# Patient Record
(demographics unavailable — no encounter records)

---

## 2025-05-13 NOTE — HISTORY OF PRESENT ILLNESS
[1] : 1 [FreeTextEntry1] : 52-year-old male presents to the office for BPH. Pt has a h/o left nephrolithiasis s/p left ESWL 01/2017. He presents for PSA results.  Patient reports doing well. He has no significant urinary symptoms to report. He denies any fever, nausea, and other constitutional symptoms.  He has a family history of prostate cancer in his father at 70 years old, father s/p prostatectomy.   All past and present data reviewed: 05/2023 UA= neg  11/2017 PSA= 0.5  05/2021 PSA= 0.6  04/2022 PSA= 0.6 05/2023 PSA= 0.5 04/2024 PSA= 0.6 05/2025 PSA = 0.7  12/2017 KUB= no stones seen 05/2021 KUB= no stones seen 04/2022 KUB= no stones seen  [Urinary Incontinence] : no urinary incontinence [Urinary Urgency] : no urinary urgency [Urinary Frequency] : no urinary frequency [Nocturia] : no nocturia [Weak Stream] : no weak stream

## 2025-05-13 NOTE — PHYSICAL EXAM
[General Appearance - Well Developed] : well developed [General Appearance - Well Nourished] : well nourished [Normal Appearance] : normal appearance [Well Groomed] : well groomed [Abdomen Soft] : soft [Skin Color & Pigmentation] : normal skin color and pigmentation [Edema] : no peripheral edema [] : no respiratory distress [Respiration, Rhythm And Depth] : normal respiratory rhythm and effort [Oriented To Time, Place, And Person] : oriented to person, place, and time [Affect] : the affect was normal [Mood] : the mood was normal [Not Anxious] : not anxious [Normal Station and Gait] : the gait and station were normal for the patient's age [No Focal Deficits] : no focal deficits [Chaperone Declined] : Patient declined chaperone [FreeTextEntry1] : not indicated

## 2025-05-13 NOTE — ASSESSMENT
[FreeTextEntry1] : 1. left nephrolithiasis s/p left ESWL 1/2017 --- inactive 2. +FH prostate cancer -- father 3. BPH -   Plan: - Discussed PSA results,  - Discussed the importance of testing PSA for someone with a FH of prostate CA.  - Discussed the acceptable range of PSA in his age group - Discussed how stones are less likely to form at patient's age  - PSA 12 months  - RTO 12 months - Patient has no other additional questions

## 2025-05-13 NOTE — END OF VISIT
[Time Spent: ___ minutes] : I have spent [unfilled] minutes of time on the encounter which excludes teaching and separately reported services. [FreeTextEntry3] : Patient notes was transcribed by medical scribreji Carpenter under the supervision of Dr. Medina. And I have reviewed the patient's chart and agree that it aligns with my medical decisions.